# Patient Record
Sex: FEMALE | Race: WHITE
[De-identification: names, ages, dates, MRNs, and addresses within clinical notes are randomized per-mention and may not be internally consistent; named-entity substitution may affect disease eponyms.]

---

## 2017-02-13 ENCOUNTER — HOSPITAL ENCOUNTER (OUTPATIENT)
Dept: HOSPITAL 57 - BURMRI | Age: 67
Discharge: HOME | End: 2017-02-13
Attending: FAMILY MEDICINE
Payer: MEDICARE

## 2017-02-13 DIAGNOSIS — M48.06: ICD-10-CM

## 2017-02-13 DIAGNOSIS — M47.816: ICD-10-CM

## 2017-02-13 DIAGNOSIS — M54.12: Primary | ICD-10-CM

## 2017-02-13 PROCEDURE — 72141 MRI NECK SPINE W/O DYE: CPT

## 2017-02-13 NOTE — MRI
MRI CERVICAL SPINE WITHOUT CONTRAST:

 

Date:

02/13/17 

 

HISTORY:  

Right cervical radiculopathy. Right-sided muscle tightness to neck. Fall in shower August 2016. 

 

TECHNIQUE:  

Multiplanar, multisequence MRI of cervical spine performed without contrast. 

 

FINDINGS:

 

Marrow signal is normal of cervical spine. No marrow replacement process. 

 

Cerebellar tonsils terminate above the foramen magnum. 

 

Mild straightening of the cervical spine. 

 

Paraspinal soft tissues are unremarkable. 

 

Findings are as follows:

 

C1-2: 

Normal. 

 

C2-3: 

Normal. 

 

C3-4: 

There is approximately 2.0 mm anterolisthesis of C3 over C4 due to bilateral moderate disc arthropat
hy. There is mild left-sided neural foraminal narrowing. 

 

 

C4-5: 

Moderate degenerative disc space height loss of broad based posterior disc bulge. Minimal effacement
 of the ventral CSF space. No significant neural foraminal narrowing. Mild uncovertebral hypertrophy
. 

 

C5-6:

Moderate degenerative disc space height loss with uncovertebral hypertrophy, as well as broad based 
posterior disc protrusion. This protrusion, along with uncovertebral hypertrophy and moderate facet 
arthrosis, causes moderate bilateral neural foraminal narrowing at this level. The posterior disc os
teophyte complex completely effaces the ventral CSF space with near cord abutment with the canal rosalva
suring approximately 8.0 mm. 

 

C6-7: 

Mild degenerative disc space height loss with uncovertebral hypertrophy and broad based posterior di
sc osteophyte complex. This causes mild effacement of the ventral CSF space. There is associated lig
amentum flavum hypertrophy. The spinal canal is narrowed to approximately 8.0 mm. There is moderate 
to severe bilateral neural foraminal narrowing at this level. 

 

C7-T1: 

No significant neural foraminal or spinal canal narrowing. 

 

IMPRESSION: 

Moderate spondylosis, most severe at C6-7, with moderate to severe bilateral neural foraminal narrow
ing at this level, as well as mild spinal stenosis. 

 

 

POS: [facility]

## 2023-07-05 ENCOUNTER — HOSPITAL ENCOUNTER (EMERGENCY)
Dept: HOSPITAL 92 - ERS | Age: 73
Discharge: HOME | End: 2023-07-05
Payer: MEDICARE

## 2023-07-05 DIAGNOSIS — S93.402A: Primary | ICD-10-CM
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
## 2023-07-05 DIAGNOSIS — I10: ICD-10-CM
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
